# Patient Record
Sex: FEMALE | Race: WHITE | NOT HISPANIC OR LATINO
[De-identification: names, ages, dates, MRNs, and addresses within clinical notes are randomized per-mention and may not be internally consistent; named-entity substitution may affect disease eponyms.]

---

## 2023-10-10 PROBLEM — Z00.00 ENCOUNTER FOR PREVENTIVE HEALTH EXAMINATION: Status: ACTIVE | Noted: 2023-10-10

## 2023-10-11 ENCOUNTER — APPOINTMENT (OUTPATIENT)
Dept: CARDIOLOGY | Facility: CLINIC | Age: 78
End: 2023-10-11
Payer: MEDICARE

## 2023-10-11 ENCOUNTER — NON-APPOINTMENT (OUTPATIENT)
Age: 78
End: 2023-10-11

## 2023-10-11 VITALS
HEART RATE: 67 BPM | HEIGHT: 64 IN | WEIGHT: 146 LBS | SYSTOLIC BLOOD PRESSURE: 150 MMHG | BODY MASS INDEX: 24.92 KG/M2 | OXYGEN SATURATION: 94 % | DIASTOLIC BLOOD PRESSURE: 70 MMHG

## 2023-10-11 VITALS — SYSTOLIC BLOOD PRESSURE: 144 MMHG | DIASTOLIC BLOOD PRESSURE: 72 MMHG

## 2023-10-11 DIAGNOSIS — Z87.891 PERSONAL HISTORY OF NICOTINE DEPENDENCE: ICD-10-CM

## 2023-10-11 DIAGNOSIS — Z82.49 FAMILY HISTORY OF ISCHEMIC HEART DISEASE AND OTHER DISEASES OF THE CIRCULATORY SYSTEM: ICD-10-CM

## 2023-10-11 DIAGNOSIS — Z85.828 PERSONAL HISTORY OF OTHER MALIGNANT NEOPLASM OF SKIN: ICD-10-CM

## 2023-10-11 DIAGNOSIS — Z78.9 OTHER SPECIFIED HEALTH STATUS: ICD-10-CM

## 2023-10-11 PROCEDURE — 93000 ELECTROCARDIOGRAM COMPLETE: CPT

## 2023-10-11 PROCEDURE — 99204 OFFICE O/P NEW MOD 45 MIN: CPT

## 2023-10-11 RX ORDER — ALPRAZOLAM 0.5 MG/1
0.5 TABLET ORAL
Refills: 0 | Status: ACTIVE | COMMUNITY

## 2023-10-11 RX ORDER — ASPIRIN 81 MG
81 TABLET, DELAYED RELEASE (ENTERIC COATED) ORAL DAILY
Refills: 0 | Status: ACTIVE | COMMUNITY

## 2023-10-11 RX ORDER — ROSUVASTATIN CALCIUM 10 MG/1
10 TABLET, FILM COATED ORAL DAILY
Refills: 0 | Status: ACTIVE | COMMUNITY

## 2023-10-11 RX ORDER — LEVOTHYROXINE SODIUM 0.11 MG/1
112 TABLET ORAL DAILY
Refills: 0 | Status: ACTIVE | COMMUNITY

## 2023-10-11 RX ORDER — DILTIAZEM HYDROCHLORIDE 120 MG/1
120 CAPSULE, EXTENDED RELEASE ORAL DAILY
Refills: 0 | Status: ACTIVE | COMMUNITY

## 2023-11-14 ENCOUNTER — APPOINTMENT (OUTPATIENT)
Dept: CARDIOLOGY | Facility: CLINIC | Age: 78
End: 2023-11-14
Payer: MEDICARE

## 2023-11-14 VITALS
HEIGHT: 64 IN | WEIGHT: 148 LBS | RESPIRATION RATE: 14 BRPM | HEART RATE: 65 BPM | OXYGEN SATURATION: 98 % | SYSTOLIC BLOOD PRESSURE: 138 MMHG | BODY MASS INDEX: 25.27 KG/M2 | DIASTOLIC BLOOD PRESSURE: 68 MMHG

## 2023-11-14 DIAGNOSIS — R07.89 OTHER CHEST PAIN: ICD-10-CM

## 2023-11-14 DIAGNOSIS — F41.9 ANXIETY DISORDER, UNSPECIFIED: ICD-10-CM

## 2023-11-14 PROCEDURE — 99214 OFFICE O/P EST MOD 30 MIN: CPT

## 2023-11-14 RX ORDER — DILTIAZEM HYDROCHLORIDE 120 MG/1
120 CAPSULE, EXTENDED RELEASE ORAL
Qty: 90 | Refills: 3 | Status: DISCONTINUED | COMMUNITY
Start: 2023-10-11 | End: 2023-11-14

## 2023-11-14 RX ORDER — HYDROCHLOROTHIAZIDE 25 MG/1
25 TABLET ORAL DAILY
Qty: 0.5 | Refills: 0 | Status: ACTIVE | COMMUNITY
Start: 1900-01-01 | End: 1900-01-01

## 2023-11-14 RX ORDER — UBIDECARENONE 200 MG
200 CAPSULE ORAL
Refills: 0 | Status: ACTIVE | COMMUNITY

## 2023-11-14 RX ORDER — OMEGA-3/DHA/EPA/FISH OIL 300-1000MG
400 CAPSULE ORAL
Refills: 0 | Status: ACTIVE | COMMUNITY

## 2023-11-14 RX ORDER — LOSARTAN POTASSIUM 25 MG/1
25 TABLET, FILM COATED ORAL
Qty: 180 | Refills: 1 | Status: ACTIVE | COMMUNITY
Start: 2023-10-11 | End: 1900-01-01

## 2024-03-04 ENCOUNTER — APPOINTMENT (OUTPATIENT)
Dept: CARDIOLOGY | Facility: CLINIC | Age: 79
End: 2024-03-04
Payer: MEDICARE

## 2024-03-04 PROCEDURE — 93306 TTE W/DOPPLER COMPLETE: CPT

## 2024-03-21 PROBLEM — R06.09 DYSPNEA ON EXERTION: Status: ACTIVE | Noted: 2023-10-11

## 2024-03-21 PROBLEM — G62.9 NEUROPATHY: Status: ACTIVE | Noted: 2023-10-11

## 2024-03-21 NOTE — ASSESSMENT
[FreeTextEntry1] : Reviewed today:  EKGs from outside office reviewed.  EKG from today as noted above  Labs September 20, 2023 TSH 2.2 CBC stable creatinine 0.63 sodium 135 potassium 4.3 LFT normal LDL 79 HDL 59 triglycerides 127 total cholesterol 160.

## 2024-03-28 ENCOUNTER — APPOINTMENT (OUTPATIENT)
Dept: CARDIOLOGY | Facility: CLINIC | Age: 79
End: 2024-03-28
Payer: MEDICARE

## 2024-03-28 VITALS
DIASTOLIC BLOOD PRESSURE: 76 MMHG | HEART RATE: 68 BPM | WEIGHT: 157 LBS | OXYGEN SATURATION: 98 % | SYSTOLIC BLOOD PRESSURE: 118 MMHG | HEIGHT: 64 IN | BODY MASS INDEX: 26.8 KG/M2

## 2024-03-28 DIAGNOSIS — G62.9 POLYNEUROPATHY, UNSPECIFIED: ICD-10-CM

## 2024-03-28 DIAGNOSIS — R06.09 OTHER FORMS OF DYSPNEA: ICD-10-CM

## 2024-03-28 PROCEDURE — 99214 OFFICE O/P EST MOD 30 MIN: CPT

## 2024-03-28 PROCEDURE — G2211 COMPLEX E/M VISIT ADD ON: CPT

## 2024-03-28 NOTE — DISCUSSION/SUMMARY
[FreeTextEntry1] : Cathi is a 78-year-old female with medical history detailed above and active medical issues including:  - Recurrent chest pain, dyspnea on exertion, multiple CAD risk factors.  Coronary CTA and coronary calcium score ordered to assess for obstructive CAD and risk stratification.   - Hypertension, average resting BPs at guideline goal on losartan HCTZ and diltiazem.  - Hyperlipidemia on Crestor 10 Mg daily well-tolerated  - Abnormal EKG bifascicular block prior bradycardia improved on lower dose diltiazem currently off Toprol.  Advised patient to follow active lifestyle with regular cardiovascular exercise. Patient educated on heart healthy diet. Recommend increased oral hydration with electrolyte supplement drinks, avoid excess alcohol and caffeine.  Patient is aware to call with any symptoms or concerns.   Cardiology follow-up after noninvasive testing.  Cathi will follow-up with Dr. Alma Starkey for primary care  Total time spent 45 minutes, reviewing of test results, chart information, patient discussion, physical exam and completion of chart documentation.

## 2024-03-28 NOTE — CARDIOLOGY SUMMARY
[de-identified] : October 11, 2023.  Normal sinus rhythm.  First-degree AV block bifascicular block.  LVH anteroseptal infarct of undetermined age. Prior EKGs from primary care doctor's office had shown significant sinus bradycardia.  First-degree AV block.  Right bundle branch block.  Left anterior hemiblock.  LVH.  Possible anteroseptal infarct of undetermined age.

## 2024-03-28 NOTE — REASON FOR VISIT
[Other: ____] : [unfilled] [FreeTextEntry1] : Cathi is a 78-year-old female with history of HTN, HL, hypothyroidism, abnormal EKG.   No history of CAD, MI, revascularization, VHD, CHF, TIA, CVA, diabetes, PVD, DVT, PE, arrhythmia, AF.  Patient has recurrent chest pain  Pressure occurring in the evening while at rest, nonradiating nonreproducible, moderate intensity.  Patient has dyspnea with moderate exertion.  Cardiovascular review of symptoms is negative for palpitations, dizziness or syncope.  No PND or orthopnea leg edema.  No bleeding or black stool.  No exercise routine.  Patient is walking 10 minutes on occasion.  Patient recently moved back to [place] from [place].   Echocardiogram March 2024 LVEF 60 to 65%, mild MR and TR, normal RVSP.

## 2024-03-28 NOTE — PHYSICAL EXAM
[Normal S1, S2] : normal S1, S2 [No Rub] : no rub [Normal Radial B/L] : normal radial B/L [Normal] : normal gait [Edema ___] : edema [unfilled] [Moves all extremities] : moves all extremities [Alert and Oriented] : alert and oriented [de-identified] : Systolic murmur.

## 2024-04-19 ENCOUNTER — APPOINTMENT (OUTPATIENT)
Dept: FAMILY MEDICINE | Facility: CLINIC | Age: 79
End: 2024-04-19
Payer: MEDICARE

## 2024-04-19 VITALS
WEIGHT: 156 LBS | HEIGHT: 68 IN | SYSTOLIC BLOOD PRESSURE: 120 MMHG | BODY MASS INDEX: 23.64 KG/M2 | RESPIRATION RATE: 18 BRPM | TEMPERATURE: 97.2 F | HEART RATE: 87 BPM | OXYGEN SATURATION: 95 % | DIASTOLIC BLOOD PRESSURE: 70 MMHG

## 2024-04-19 DIAGNOSIS — J20.9 ACUTE BRONCHITIS, UNSPECIFIED: ICD-10-CM

## 2024-04-19 DIAGNOSIS — J02.9 ACUTE PHARYNGITIS, UNSPECIFIED: ICD-10-CM

## 2024-04-19 DIAGNOSIS — R05.1 ACUTE COUGH: ICD-10-CM

## 2024-04-19 LAB — S PYO AG SPEC QL IA: NEGATIVE

## 2024-04-19 PROCEDURE — 87880 STREP A ASSAY W/OPTIC: CPT | Mod: QW

## 2024-04-19 PROCEDURE — 99203 OFFICE O/P NEW LOW 30 MIN: CPT

## 2024-04-19 RX ORDER — AZITHROMYCIN 250 MG/1
250 TABLET, FILM COATED ORAL
Qty: 1 | Refills: 0 | Status: ACTIVE | COMMUNITY
Start: 2024-04-19 | End: 1900-01-01

## 2024-04-22 LAB
BACTERIA THROAT CULT: NORMAL
INFLUENZA A RESULT: NOT DETECTED
INFLUENZA B RESULT: NOT DETECTED
RESP SYN VIRUS RESULT: DETECTED
SARS-COV-2 RESULT: NOT DETECTED

## 2024-05-16 ENCOUNTER — APPOINTMENT (OUTPATIENT)
Dept: CARDIOLOGY | Facility: CLINIC | Age: 79
End: 2024-05-16
Payer: MEDICARE

## 2024-05-16 VITALS
WEIGHT: 154 LBS | SYSTOLIC BLOOD PRESSURE: 130 MMHG | OXYGEN SATURATION: 96 % | HEART RATE: 75 BPM | HEIGHT: 68 IN | DIASTOLIC BLOOD PRESSURE: 70 MMHG | BODY MASS INDEX: 23.34 KG/M2

## 2024-05-16 DIAGNOSIS — R94.31 ABNORMAL ELECTROCARDIOGRAM [ECG] [EKG]: ICD-10-CM

## 2024-05-16 DIAGNOSIS — E78.5 HYPERLIPIDEMIA, UNSPECIFIED: ICD-10-CM

## 2024-05-16 DIAGNOSIS — I45.2 BIFASCICULAR BLOCK: ICD-10-CM

## 2024-05-16 DIAGNOSIS — R07.9 CHEST PAIN, UNSPECIFIED: ICD-10-CM

## 2024-05-16 DIAGNOSIS — I10 ESSENTIAL (PRIMARY) HYPERTENSION: ICD-10-CM

## 2024-05-16 PROCEDURE — 99214 OFFICE O/P EST MOD 30 MIN: CPT

## 2024-05-16 NOTE — DISCUSSION/SUMMARY
[FreeTextEntry1] : HODAN HEARD is a 78 year old F who presents today May 16, 2024 with the above history and the following active issues:  - Hypertension: Continue HCTZ (patient thinks she takes half a tablet daily) and also continue Losartan.  - Hyperlipidemia on Crestor  Ca score 111. Nonobstructive. On ASA and statin. Still with nonexertional chest pains. Recommend nuke on meds. Obtain carotid ultrasound to evaluate for evidence of significant carotid atherosclerosis or obstruction. Obtain abdominal ultrasound to evaluate for aortic aneurysm or significant aortic atherosclerosis. Note smoking hx. Leg cramps: ABIs ordered.  - Abnormal EKG bifascicular block prior bradycardia improved on lower dose diltiazem currently off Toprol.  Ongoing f/u with PCP.  F/U after testing to review results. Discussed red flag symptoms, which would warrant sooner or emergent medical evaluation. Any questions and concerns were addressed and resolved.  Sincerely, Yadira Coelho [facility] Patient's history, testing, and plan was reviewed with supervising physician, Dr. Patrick Valentino

## 2024-05-16 NOTE — HISTORY OF PRESENT ILLNESS
[FreeTextEntry1] : HODAN HEARD is a 78 year old female with a past medical history of HTN, HLD, hypothyroidism, abn EKG.  Last seen 3/28/24. Here to review CTA. See details below. Patient states she is still getting an "uncomfortable feeling in her chest". Nonexertional. Nonradiating. She states her palpitaions are improving. Also reports leg cramps at night. Denies SOB, dizziness, lightheaedness, syncope, and edema. Former smoker. Not on a formal exercise regimen.  /70 on my exam.  Testing:  CTA angio 4/25/24: Calcium score 111. Hiatal hernia. Spondylosis of the thoracic spine. Minimal luminal narrowing of the prox left anterior descending coronary artery and first diagonal division. Minimal luminal narrowing of the prox RCA.  Echocardiogram March 2024 LVEF 60 to 65%, mild MR and TR, normal RVSP.  Zio 10/2023: SR average HR 60 bpm. 1st degree AV block, BBB/IVCD present. PAC burden <1%. PVC burden <1%. V couplets noted. V bigem noted. 1 run of seq PACs, 6 beats. Sx's with SR, PACs, PVCs.   ECG: October 11, 2023. Normal sinus rhythm. First-degree AV block bifascicular block. LVH anteroseptal infarct of undetermined age.  Prior EKGs from primary care doctor's office had shown significant sinus bradycardia. First-degree AV block. Right bundle branch block. Left anterior hemiblock. LVH. Possible anteroseptal infarct of undetermined age.  Labs 9/2023: TSH 2.23, WBC 7/1, Hgb 14.1, HCT 40.5, plt 283, Cr 0.63, Na 135, K 4.3, Ca 10.8, AST 29, ALT 18, Chol 160, Trigs 127, HDL 59, LDL 79, Calcium, ionized, serum 5.7,

## 2024-06-05 ENCOUNTER — RX RENEWAL (OUTPATIENT)
Age: 79
End: 2024-06-05

## 2024-06-05 RX ORDER — ALBUTEROL SULFATE 90 UG/1
108 (90 BASE) INHALANT RESPIRATORY (INHALATION)
Qty: 3 | Refills: 1 | Status: ACTIVE | COMMUNITY
Start: 2024-04-19 | End: 1900-01-01

## 2024-06-10 ENCOUNTER — LABORATORY RESULT (OUTPATIENT)
Age: 79
End: 2024-06-10

## 2024-06-10 ENCOUNTER — APPOINTMENT (OUTPATIENT)
Dept: FAMILY MEDICINE | Facility: CLINIC | Age: 79
End: 2024-06-10
Payer: MEDICARE

## 2024-06-10 VITALS
HEART RATE: 76 BPM | WEIGHT: 157 LBS | OXYGEN SATURATION: 98 % | HEIGHT: 68 IN | BODY MASS INDEX: 23.79 KG/M2 | SYSTOLIC BLOOD PRESSURE: 140 MMHG | DIASTOLIC BLOOD PRESSURE: 80 MMHG | RESPIRATION RATE: 18 BRPM | TEMPERATURE: 97.2 F

## 2024-06-10 DIAGNOSIS — S80.869A INSECT BITE (NONVENOMOUS), UNSPECIFIED LOWER LEG, INITIAL ENCOUNTER: ICD-10-CM

## 2024-06-10 DIAGNOSIS — L29.9 PRURITUS, UNSPECIFIED: ICD-10-CM
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
## 2024-06-10 PROCEDURE — 99213 OFFICE O/P EST LOW 20 MIN: CPT

## 2024-06-10 PROCEDURE — 36415 COLL VENOUS BLD VENIPUNCTURE: CPT

## 2024-06-10 RX ORDER — HYDROXYZINE HYDROCHLORIDE 10 MG/1
10 TABLET ORAL
Qty: 30 | Refills: 0 | Status: ACTIVE | COMMUNITY
Start: 2024-06-10 | End: 1900-01-01

## 2024-06-10 NOTE — PLAN
[FreeTextEntry1] : Possible tick bite-tick panel ordered. Pruritus-educated on using ice packs, discussed use of hydroxyzine 10 mg. Risks, benefits and alternatives to medication were discussed with patient who verbalized understanding.  Educated on risk for falls, educated to change positions with her  with her to assist.  Patient and spouse verbalized understanding. CRP and sed rate drawn for concern with repeat polymyalgia rheumatica. Follow-up in 1 week if symptoms continue.

## 2024-06-10 NOTE — HISTORY OF PRESENT ILLNESS
[FreeTextEntry8] : Patient presents for possible tick bite to left lower leg. It occurred about 1 month ago. She complains of body aches that started 5-6 days ago, rash to right buttocks and lower back about 3-4 days ago. No fevers. Denies previous tick illnesses. Has previous had polymyalgia rheumatica- feels similar to current symptoms.  She states she was on prednisone for approximately 1 year.

## 2024-06-12 DIAGNOSIS — E83.52 HYPERCALCEMIA: ICD-10-CM

## 2024-06-12 LAB
ALBUMIN SERPL ELPH-MCNC: 4.6 G/DL
ALP BLD-CCNC: 72 U/L
ALT SERPL-CCNC: 24 U/L
ANION GAP SERPL CALC-SCNC: 44 MMOL/L
AST SERPL-CCNC: 41 U/L
BASOPHILS # BLD AUTO: 0.1 K/UL
BASOPHILS NFR BLD AUTO: 0.8 %
BILIRUB SERPL-MCNC: 0.3 MG/DL
BUN SERPL-MCNC: 16 MG/DL
CALCIUM SERPL-MCNC: 9.3 MG/DL
CHLORIDE SERPL-SCNC: 83 MMOL/L
CO2 SERPL-SCNC: 12 MMOL/L
CREAT SERPL-MCNC: 0.63 MG/DL
CRP SERPL-MCNC: 7 MG/L
EGFR: 90 ML/MIN/1.73M2
EOSINOPHIL # BLD AUTO: 0.23 K/UL
EOSINOPHIL NFR BLD AUTO: 1.9 %
ERYTHROCYTE [SEDIMENTATION RATE] IN BLOOD BY WESTERGREN METHOD: 23 MM/HR
GLUCOSE SERPL-MCNC: 28 MG/DL
HCT VFR BLD CALC: 41.7 %
HGB BLD-MCNC: 13.9 G/DL
IMM GRANULOCYTES NFR BLD AUTO: 0.5 %
LYMPHOCYTES # BLD AUTO: 1.69 K/UL
LYMPHOCYTES NFR BLD AUTO: 14.1 %
MAN DIFF?: NORMAL
MCHC RBC-ENTMCNC: 33 PG
MCHC RBC-ENTMCNC: 33.3 GM/DL
MCV RBC AUTO: 99 FL
MONOCYTES # BLD AUTO: 0.86 K/UL
MONOCYTES NFR BLD AUTO: 7.2 %
NEUTROPHILS # BLD AUTO: 9.05 K/UL
NEUTROPHILS NFR BLD AUTO: 75.5 %
PLATELET # BLD AUTO: 349 K/UL
POTASSIUM SERPL-SCNC: 9.9 MMOL/L
PROT SERPL-MCNC: 7.4 G/DL
RBC # BLD: 4.21 M/UL
RBC # FLD: 12.9 %
SODIUM SERPL-SCNC: 138 MMOL/L
WBC # FLD AUTO: 11.99 K/UL

## 2024-06-13 LAB
ALBUMIN SERPL ELPH-MCNC: 4.3 G/DL
ALP BLD-CCNC: 65 U/L
ALT SERPL-CCNC: 15 U/L
ANION GAP SERPL CALC-SCNC: 13 MMOL/L
AST SERPL-CCNC: 27 U/L
BILIRUB SERPL-MCNC: 0.6 MG/DL
BUN SERPL-MCNC: 13 MG/DL
CALCIUM SERPL-MCNC: 10.7 MG/DL
CHLORIDE SERPL-SCNC: 99 MMOL/L
CO2 SERPL-SCNC: 25 MMOL/L
CREAT SERPL-MCNC: 0.66 MG/DL
EGFR: 89 ML/MIN/1.73M2
GLUCOSE SERPL-MCNC: 101 MG/DL
POTASSIUM SERPL-SCNC: 4.6 MMOL/L
PROT SERPL-MCNC: 7 G/DL
SODIUM SERPL-SCNC: 137 MMOL/L

## 2024-06-14 LAB
A PHAGOCYTOPH IGG TITR SER IF: NORMAL
B BURGDOR AB SER QL IA: 0.17 IV
B MICROTI IGG TITR SER: NORMAL
E CHAFFEENSIS IGG TITR SER IF: NORMAL

## 2024-06-20 LAB
R RICKETTSI IGG CSF-ACNC: NORMAL
R RICKETTSI IGM CSF-ACNC: NORMAL
ROCKY MT SPOTTED FEVER COMMENT: NORMAL

## 2024-07-11 ENCOUNTER — APPOINTMENT (OUTPATIENT)
Dept: CARDIOLOGY | Facility: CLINIC | Age: 79
End: 2024-07-11

## 2024-07-31 ENCOUNTER — APPOINTMENT (OUTPATIENT)
Dept: CARDIOLOGY | Facility: CLINIC | Age: 79
End: 2024-07-31
Payer: MEDICARE

## 2024-07-31 DIAGNOSIS — I73.9 PERIPHERAL VASCULAR DISEASE, UNSPECIFIED: ICD-10-CM

## 2024-07-31 PROCEDURE — A9502: CPT

## 2024-07-31 PROCEDURE — 93923 UPR/LXTR ART STDY 3+ LVLS: CPT

## 2024-07-31 PROCEDURE — 93880 EXTRACRANIAL BILAT STUDY: CPT

## 2024-07-31 PROCEDURE — 93015 CV STRESS TEST SUPVJ I&R: CPT

## 2024-07-31 PROCEDURE — 78452 HT MUSCLE IMAGE SPECT MULT: CPT

## 2024-08-03 ENCOUNTER — TRANSCRIPTION ENCOUNTER (OUTPATIENT)
Age: 79
End: 2024-08-03

## 2024-08-08 ENCOUNTER — APPOINTMENT (OUTPATIENT)
Dept: CARDIOLOGY | Facility: CLINIC | Age: 79
End: 2024-08-08

## 2024-08-08 PROCEDURE — G2211 COMPLEX E/M VISIT ADD ON: CPT

## 2024-08-08 PROCEDURE — 99215 OFFICE O/P EST HI 40 MIN: CPT

## 2024-08-08 NOTE — PHYSICAL EXAM
[Normal S1, S2] : normal S1, S2 [No Rub] : no rub [Normal] : normal gait [Normal Radial B/L] : normal radial B/L [Edema ___] : edema [unfilled] [Moves all extremities] : moves all extremities [Alert and Oriented] : alert and oriented [de-identified] : Systolic murmur.

## 2024-08-08 NOTE — CARDIOLOGY SUMMARY
[de-identified] : October 11, 2023.  Normal sinus rhythm.  First-degree AV block bifascicular block.  LVH anteroseptal infarct of undetermined age. Prior EKGs from primary care doctor's office had shown significant sinus bradycardia.  First-degree AV block.  Right bundle branch block.  Left anterior hemiblock.  LVH.  Possible anteroseptal infarct of undetermined age.

## 2024-08-08 NOTE — REASON FOR VISIT
[Other: ____] : [unfilled] [FreeTextEntry1] : Cathi has a past medical history of HTN, HL, hypothyroidism, abnormal EKG.   No history of CAD, MI, revascularization, VHD, CHF, TIA, CVA, diabetes, PVD, DVT, PE, arrhythmia, AF.  Patient has recurrent chest pain  Pressure occurring in the evening while at rest, nonradiating nonreproducible, moderate intensity.  Patient has dyspnea with moderate exertion.  Cardiovascular review of symptoms is negative for palpitations, dizziness or syncope.  No PND or orthopnea leg edema.  No bleeding or black stool.  No exercise routine.  Patient is walking 10 minutes on occasion.  Patient recently moved back to [place] from [place].   Echocardiogram March 2024 LVEF 60 to 65%, mild MR and TR, normal RVSP.

## 2024-08-08 NOTE — PHYSICAL EXAM
[Normal S1, S2] : normal S1, S2 [No Rub] : no rub [Normal] : normal gait [Normal Radial B/L] : normal radial B/L [Edema ___] : edema [unfilled] [Moves all extremities] : moves all extremities [Alert and Oriented] : alert and oriented [de-identified] : Systolic murmur.

## 2024-08-08 NOTE — DISCUSSION/SUMMARY
[FreeTextEntry1] : Patient has medical history detailed above and active medical issues including:  - Hospital follow-up [facility] 8/4/2024 hypertensive urgency, hyponatremia HCTZ discontinued, discharged home on amlodipine 5 Mg daily, losartan 25 mg twice daily diltiazem 120 Mg daily.  Average resting BPs above guideline goal, losartan increased 100 Mg daily in the evening, amlodipine 5 Mg daily in the evening, diltiazem 120 Mg daily with follow-up average home BPs prior to dose.  Discussed moderate exercise, weight loss, low salt diet, avoidance of alcohol and caffeine.  - Atypical chest pain resolved, nonobstructive coronary CTA with moderate risk coronary calcium score 111 April 2024  - Hyperlipidemia on Crestor 10 Mg daily well-tolerated  - Abnormal EKG bifascicular block prior bradycardia improved on lower dose diltiazem currently off Toprol.  Advised patient to follow active lifestyle with regular cardiovascular exercise. Patient educated on heart healthy diet. Recommend increased oral hydration with electrolyte supplement drinks, avoid excess alcohol and caffeine.  Patient is aware to call with any symptoms or concerns.   Cardiology follow-up 6 months  Cathi will follow-up with Dr. Alma Starkey for primary care  Total time spent 45 minutes, reviewing of test results, chart information, patient discussion, physical exam and completion of chart documentation.

## 2024-08-08 NOTE — REASON FOR VISIT
[Other: ____] : [unfilled] [FreeTextEntry1] : Cathi has a past medical history of HTN, HL, hypothyroidism, abnormal EKG.   No history of CAD, MI, revascularization, VHD, CHF, TIA, CVA, diabetes, PVD, DVT, PE, arrhythmia, AF.  Patient has recurrent chest pain  Pressure occurring in the evening while at rest, nonradiating nonreproducible, moderate intensity.  Patient has dyspnea with moderate exertion.  Cardiovascular review of symptoms is negative for palpitations, dizziness or syncope.  No PND or orthopnea leg edema.  No bleeding or black stool.  No exercise routine.  Patient is walking 10 minutes on occasion.  Patient recently moved back to [place] from [place].   Echocardiogram March 2024 LVEF 60 to 65%, mild MR and TR, normal RVSP.

## 2024-08-08 NOTE — CARDIOLOGY SUMMARY
[de-identified] : October 11, 2023.  Normal sinus rhythm.  First-degree AV block bifascicular block.  LVH anteroseptal infarct of undetermined age. Prior EKGs from primary care doctor's office had shown significant sinus bradycardia.  First-degree AV block.  Right bundle branch block.  Left anterior hemiblock.  LVH.  Possible anteroseptal infarct of undetermined age.

## 2024-09-05 ENCOUNTER — APPOINTMENT (OUTPATIENT)
Dept: CARDIOLOGY | Facility: CLINIC | Age: 79
End: 2024-09-05
Payer: MEDICARE

## 2024-09-05 ENCOUNTER — APPOINTMENT (OUTPATIENT)
Dept: CARDIOLOGY | Facility: CLINIC | Age: 79
End: 2024-09-05

## 2024-09-05 VITALS
HEART RATE: 75 BPM | SYSTOLIC BLOOD PRESSURE: 152 MMHG | WEIGHT: 158 LBS | BODY MASS INDEX: 23.95 KG/M2 | OXYGEN SATURATION: 99 % | HEIGHT: 68 IN | DIASTOLIC BLOOD PRESSURE: 60 MMHG

## 2024-09-05 DIAGNOSIS — R06.09 OTHER FORMS OF DYSPNEA: ICD-10-CM

## 2024-09-05 DIAGNOSIS — I10 ESSENTIAL (PRIMARY) HYPERTENSION: ICD-10-CM

## 2024-09-05 DIAGNOSIS — E78.5 HYPERLIPIDEMIA, UNSPECIFIED: ICD-10-CM

## 2024-09-05 PROCEDURE — 99215 OFFICE O/P EST HI 40 MIN: CPT

## 2024-09-05 PROCEDURE — G2211 COMPLEX E/M VISIT ADD ON: CPT

## 2024-09-05 RX ORDER — PREDNISONE 5 MG/1
5 TABLET ORAL DAILY
Refills: 0 | Status: ACTIVE | COMMUNITY

## 2024-09-05 RX ORDER — OLMESARTAN MEDOXOMIL 40 MG/1
40 TABLET, FILM COATED ORAL
Qty: 90 | Refills: 0 | Status: ACTIVE | COMMUNITY
Start: 2024-09-05 | End: 1900-01-01

## 2024-09-05 NOTE — DISCUSSION/SUMMARY
[FreeTextEntry1] : Patient has medical history detailed above and active medical issues including:  - Hospital follow-up [facility] 8/4/2024 hypertensive urgency, hyponatremia HCTZ discontinued, discharged home on amlodipine 5 Mg daily, losartan 25 mg twice daily diltiazem 120 Mg daily.  Average resting BPs above guideline goal, losartan increased 100 Mg daily in the evening, amlodipine 5 Mg daily in the evening, diltiazem 120 Mg daily with follow-up average home BPs prior to dose.  Discussed moderate exercise, weight loss, low salt diet, avoidance of alcohol and caffeine. Losartan changed to Benicar 40 Mg daily with follow-up average resting home BPs prior to dose.  - Atypical chest pain resolved, nonobstructive coronary CTA with moderate risk coronary calcium score 111 April 2024  - Hyperlipidemia on Crestor 10 Mg daily well-tolerated  - Abnormal EKG bifascicular block prior bradycardia improved on lower dose diltiazem currently off Toprol.  Advised patient to follow active lifestyle with regular cardiovascular exercise. Patient educated on heart healthy diet. Recommend increased oral hydration with electrolyte supplement drinks, avoid excess alcohol and caffeine.  Patient is aware to call with any symptoms or concerns.   Cardiology follow-up 6 months  Cathi will follow-up with Dr. Alma Starkey for primary care  Total time spent 45 minutes, reviewing of test results, chart information, patient discussion, physical exam and completion of chart documentation.

## 2024-09-05 NOTE — REASON FOR VISIT
[Other: ____] : [unfilled] [FreeTextEntry1] : Cathi has a past medical history of HTN, HL, hypothyroidism, abnormal EKG.   No history of CAD, MI, revascularization, VHD, CHF, TIA, CVA, diabetes, PVD, DVT, PE, arrhythmia, AF.  Patient has recurrent chest pain  Pressure occurring in the evening while at rest, nonradiating nonreproducible, moderate intensity.  Patient has dyspnea with moderate exertion.  Cardiovascular review of symptoms is negative for palpitations, dizziness or syncope.  No PND or orthopnea leg edema.  No bleeding or black stool.  No exercise routine.  Patient is walking 10 minutes on occasion.  Patient recently moved back to [place] from [place].   Echocardiogram March 2024 LVEF 60 to 65%, mild MR and TR, normal RVSP.

## 2024-09-05 NOTE — CARDIOLOGY SUMMARY
[de-identified] : October 11, 2023.  Normal sinus rhythm.  First-degree AV block bifascicular block.  LVH anteroseptal infarct of undetermined age. Prior EKGs from primary care doctor's office had shown significant sinus bradycardia.  First-degree AV block.  Right bundle branch block.  Left anterior hemiblock.  LVH.  Possible anteroseptal infarct of undetermined age.

## 2024-09-05 NOTE — PHYSICAL EXAM
[Normal S1, S2] : normal S1, S2 [No Rub] : no rub [Normal] : normal gait [Normal Radial B/L] : normal radial B/L [Edema ___] : edema [unfilled] [Moves all extremities] : moves all extremities [Alert and Oriented] : alert and oriented [de-identified] : Systolic murmur.

## 2024-11-07 ENCOUNTER — APPOINTMENT (OUTPATIENT)
Dept: CARDIOLOGY | Facility: CLINIC | Age: 79
End: 2024-11-07

## 2024-12-05 ENCOUNTER — NON-APPOINTMENT (OUTPATIENT)
Age: 79
End: 2024-12-05

## 2024-12-05 ENCOUNTER — APPOINTMENT (OUTPATIENT)
Dept: CARDIOLOGY | Facility: CLINIC | Age: 79
End: 2024-12-05
Payer: MEDICARE

## 2024-12-05 VITALS
HEIGHT: 68 IN | BODY MASS INDEX: 24.55 KG/M2 | HEART RATE: 72 BPM | DIASTOLIC BLOOD PRESSURE: 70 MMHG | SYSTOLIC BLOOD PRESSURE: 140 MMHG | WEIGHT: 162 LBS | OXYGEN SATURATION: 97 %

## 2024-12-05 DIAGNOSIS — E83.52 HYPERCALCEMIA: ICD-10-CM

## 2024-12-05 DIAGNOSIS — G62.9 POLYNEUROPATHY, UNSPECIFIED: ICD-10-CM

## 2024-12-05 DIAGNOSIS — E78.5 HYPERLIPIDEMIA, UNSPECIFIED: ICD-10-CM

## 2024-12-05 DIAGNOSIS — I10 ESSENTIAL (PRIMARY) HYPERTENSION: ICD-10-CM

## 2024-12-05 DIAGNOSIS — I45.2 BIFASCICULAR BLOCK: ICD-10-CM

## 2024-12-05 DIAGNOSIS — R94.31 ABNORMAL ELECTROCARDIOGRAM [ECG] [EKG]: ICD-10-CM

## 2024-12-05 DIAGNOSIS — R06.09 OTHER FORMS OF DYSPNEA: ICD-10-CM

## 2024-12-05 PROCEDURE — G2211 COMPLEX E/M VISIT ADD ON: CPT

## 2024-12-05 PROCEDURE — 99215 OFFICE O/P EST HI 40 MIN: CPT

## 2025-02-19 ENCOUNTER — APPOINTMENT (OUTPATIENT)
Dept: FAMILY MEDICINE | Facility: CLINIC | Age: 80
End: 2025-02-19
Payer: MEDICARE

## 2025-02-19 VITALS
DIASTOLIC BLOOD PRESSURE: 76 MMHG | SYSTOLIC BLOOD PRESSURE: 132 MMHG | TEMPERATURE: 97.4 F | WEIGHT: 168 LBS | OXYGEN SATURATION: 97 % | RESPIRATION RATE: 17 BRPM | BODY MASS INDEX: 25.46 KG/M2 | HEIGHT: 68 IN | HEART RATE: 99 BPM

## 2025-02-19 DIAGNOSIS — R94.31 ABNORMAL ELECTROCARDIOGRAM [ECG] [EKG]: ICD-10-CM

## 2025-02-19 DIAGNOSIS — I10 ESSENTIAL (PRIMARY) HYPERTENSION: ICD-10-CM

## 2025-02-19 DIAGNOSIS — J20.9 ACUTE BRONCHITIS, UNSPECIFIED: ICD-10-CM

## 2025-02-19 DIAGNOSIS — E83.52 HYPERCALCEMIA: ICD-10-CM

## 2025-02-19 PROCEDURE — 99204 OFFICE O/P NEW MOD 45 MIN: CPT | Mod: 25

## 2025-02-19 PROCEDURE — 96372 THER/PROPH/DIAG INJ SC/IM: CPT

## 2025-02-19 RX ORDER — DOXYCYCLINE HYCLATE 100 MG/1
100 TABLET ORAL TWICE DAILY
Qty: 20 | Refills: 0 | Status: ACTIVE | COMMUNITY
Start: 2025-02-19 | End: 1900-01-01

## 2025-02-19 RX ORDER — DEXAMETHASONE SODIUM PHOSPHATE 10 MG/ML
10 INJECTION, EMULSION INTRAMUSCULAR; INTRAVENOUS
Qty: 0 | Refills: 0 | Status: COMPLETED | OUTPATIENT
Start: 2025-02-19

## 2025-02-19 RX ADMIN — DEXAMETHASONE SODIUM PHOSPHATE 0 MG/ML: 10 INJECTION, SOLUTION INTRAMUSCULAR; INTRAVENOUS at 00:00

## 2025-02-24 LAB
INFLUENZA A RESULT: DETECTED
INFLUENZA B RESULT: NOT DETECTED
RESP SYN VIRUS RESULT: NOT DETECTED
SARS-COV-2 RESULT: NOT DETECTED

## 2025-03-20 ENCOUNTER — APPOINTMENT (OUTPATIENT)
Dept: CARDIOLOGY | Facility: CLINIC | Age: 80
End: 2025-03-20
Payer: MEDICARE

## 2025-03-20 VITALS
WEIGHT: 165 LBS | BODY MASS INDEX: 25.01 KG/M2 | HEART RATE: 65 BPM | DIASTOLIC BLOOD PRESSURE: 70 MMHG | SYSTOLIC BLOOD PRESSURE: 130 MMHG | OXYGEN SATURATION: 96 % | HEIGHT: 68 IN

## 2025-03-20 DIAGNOSIS — R07.9 CHEST PAIN, UNSPECIFIED: ICD-10-CM

## 2025-03-20 DIAGNOSIS — R06.09 OTHER FORMS OF DYSPNEA: ICD-10-CM

## 2025-03-20 DIAGNOSIS — I10 ESSENTIAL (PRIMARY) HYPERTENSION: ICD-10-CM

## 2025-03-20 DIAGNOSIS — I73.9 PERIPHERAL VASCULAR DISEASE, UNSPECIFIED: ICD-10-CM

## 2025-03-20 DIAGNOSIS — R94.31 ABNORMAL ELECTROCARDIOGRAM [ECG] [EKG]: ICD-10-CM

## 2025-03-20 PROCEDURE — 99214 OFFICE O/P EST MOD 30 MIN: CPT

## 2025-03-20 PROCEDURE — 93000 ELECTROCARDIOGRAM COMPLETE: CPT

## 2025-03-20 RX ORDER — DILTIAZEM HYDROCHLORIDE 120 MG/1
120 TABLET ORAL TWICE DAILY
Refills: 0 | Status: ACTIVE | COMMUNITY

## 2025-03-20 RX ORDER — METOPROLOL SUCCINATE 25 MG/1
25 TABLET, EXTENDED RELEASE ORAL DAILY
Qty: 30 | Refills: 2 | Status: DISCONTINUED | COMMUNITY
Start: 2025-03-20 | End: 2025-03-20

## 2025-03-21 ENCOUNTER — APPOINTMENT (OUTPATIENT)
Dept: CARDIOLOGY | Facility: CLINIC | Age: 80
End: 2025-03-21
Payer: MEDICARE

## 2025-03-21 PROCEDURE — 93242 EXT ECG>48HR<7D RECORDING: CPT

## 2025-03-21 PROCEDURE — 93306 TTE W/DOPPLER COMPLETE: CPT

## 2025-03-31 ENCOUNTER — NON-APPOINTMENT (OUTPATIENT)
Age: 80
End: 2025-03-31

## 2025-04-04 ENCOUNTER — RESULT REVIEW (OUTPATIENT)
Age: 80
End: 2025-04-04

## 2025-04-10 ENCOUNTER — APPOINTMENT (OUTPATIENT)
Dept: CARDIOLOGY | Facility: CLINIC | Age: 80
End: 2025-04-10
Payer: MEDICARE

## 2025-04-10 VITALS
HEIGHT: 68 IN | HEART RATE: 65 BPM | WEIGHT: 163 LBS | OXYGEN SATURATION: 97 % | BODY MASS INDEX: 24.71 KG/M2 | SYSTOLIC BLOOD PRESSURE: 144 MMHG | DIASTOLIC BLOOD PRESSURE: 62 MMHG

## 2025-04-10 DIAGNOSIS — I10 ESSENTIAL (PRIMARY) HYPERTENSION: ICD-10-CM

## 2025-04-10 DIAGNOSIS — R06.09 OTHER FORMS OF DYSPNEA: ICD-10-CM

## 2025-04-10 DIAGNOSIS — R05.1 ACUTE COUGH: ICD-10-CM

## 2025-04-10 DIAGNOSIS — I45.2 BIFASCICULAR BLOCK: ICD-10-CM

## 2025-04-10 DIAGNOSIS — R94.31 ABNORMAL ELECTROCARDIOGRAM [ECG] [EKG]: ICD-10-CM

## 2025-04-10 DIAGNOSIS — E83.52 HYPERCALCEMIA: ICD-10-CM

## 2025-04-10 DIAGNOSIS — E78.5 HYPERLIPIDEMIA, UNSPECIFIED: ICD-10-CM

## 2025-04-10 PROCEDURE — 99215 OFFICE O/P EST HI 40 MIN: CPT

## 2025-04-10 RX ORDER — DILTIAZEM HYDROCHLORIDE 240 MG/1
240 CAPSULE, EXTENDED RELEASE ORAL
Qty: 90 | Refills: 1 | Status: ACTIVE | COMMUNITY
Start: 2025-04-10 | End: 1900-01-01

## 2025-05-15 ENCOUNTER — APPOINTMENT (OUTPATIENT)
Dept: CARDIOLOGY | Facility: CLINIC | Age: 80
End: 2025-05-15

## 2025-05-15 DIAGNOSIS — E83.52 HYPERCALCEMIA: ICD-10-CM

## 2025-05-15 DIAGNOSIS — E78.5 HYPERLIPIDEMIA, UNSPECIFIED: ICD-10-CM

## 2025-05-15 DIAGNOSIS — R94.31 ABNORMAL ELECTROCARDIOGRAM [ECG] [EKG]: ICD-10-CM

## 2025-05-15 DIAGNOSIS — I10 ESSENTIAL (PRIMARY) HYPERTENSION: ICD-10-CM

## 2025-05-15 DIAGNOSIS — I45.2 BIFASCICULAR BLOCK: ICD-10-CM

## 2025-05-15 DIAGNOSIS — I73.9 PERIPHERAL VASCULAR DISEASE, UNSPECIFIED: ICD-10-CM

## 2025-05-15 DIAGNOSIS — R06.09 OTHER FORMS OF DYSPNEA: ICD-10-CM

## 2025-07-14 PROCEDURE — 93244 EXT ECG>48HR<7D REV&INTERPJ: CPT
